# Patient Record
Sex: MALE | Race: WHITE | NOT HISPANIC OR LATINO | Employment: FULL TIME | URBAN - METROPOLITAN AREA
[De-identification: names, ages, dates, MRNs, and addresses within clinical notes are randomized per-mention and may not be internally consistent; named-entity substitution may affect disease eponyms.]

---

## 2022-07-15 PROCEDURE — 99283 EMERGENCY DEPT VISIT LOW MDM: CPT | Mod: EDC

## 2022-07-16 ENCOUNTER — HOSPITAL ENCOUNTER (EMERGENCY)
Facility: MEDICAL CENTER | Age: 37
End: 2022-07-16
Attending: STUDENT IN AN ORGANIZED HEALTH CARE EDUCATION/TRAINING PROGRAM
Payer: COMMERCIAL

## 2022-07-16 VITALS
SYSTOLIC BLOOD PRESSURE: 131 MMHG | DIASTOLIC BLOOD PRESSURE: 89 MMHG | BODY MASS INDEX: 29.77 KG/M2 | HEART RATE: 60 BPM | TEMPERATURE: 98 F | HEIGHT: 73 IN | WEIGHT: 224.65 LBS | OXYGEN SATURATION: 98 % | RESPIRATION RATE: 16 BRPM

## 2022-07-16 DIAGNOSIS — T26.92XA CHEMICAL INJURY OF LEFT EYE: ICD-10-CM

## 2022-07-16 PROCEDURE — 700101 HCHG RX REV CODE 250: Performed by: STUDENT IN AN ORGANIZED HEALTH CARE EDUCATION/TRAINING PROGRAM

## 2022-07-16 PROCEDURE — 700111 HCHG RX REV CODE 636 W/ 250 OVERRIDE (IP): Performed by: STUDENT IN AN ORGANIZED HEALTH CARE EDUCATION/TRAINING PROGRAM

## 2022-07-16 PROCEDURE — 90715 TDAP VACCINE 7 YRS/> IM: CPT | Performed by: STUDENT IN AN ORGANIZED HEALTH CARE EDUCATION/TRAINING PROGRAM

## 2022-07-16 PROCEDURE — 90471 IMMUNIZATION ADMIN: CPT | Mod: EDC

## 2022-07-16 RX ORDER — PROPARACAINE HYDROCHLORIDE 5 MG/ML
1 SOLUTION/ DROPS OPHTHALMIC ONCE
Status: COMPLETED | OUTPATIENT
Start: 2022-07-16 | End: 2022-07-16

## 2022-07-16 RX ORDER — ERYTHROMYCIN 5 MG/G
OINTMENT OPHTHALMIC ONCE
Status: COMPLETED | OUTPATIENT
Start: 2022-07-16 | End: 2022-07-16

## 2022-07-16 RX ADMIN — CLOSTRIDIUM TETANI TOXOID ANTIGEN (FORMALDEHYDE INACTIVATED), CORYNEBACTERIUM DIPHTHERIAE TOXOID ANTIGEN (FORMALDEHYDE INACTIVATED), BORDETELLA PERTUSSIS TOXOID ANTIGEN (GLUTARALDEHYDE INACTIVATED), BORDETELLA PERTUSSIS FILAMENTOUS HEMAGGLUTININ ANTIGEN (FORMALDEHYDE INACTIVATED), BORDETELLA PERTUSSIS PERTACTIN ANTIGEN, AND BORDETELLA PERTUSSIS FIMBRIAE 2/3 ANTIGEN 0.5 ML: 5; 2; 2.5; 5; 3; 5 INJECTION, SUSPENSION INTRAMUSCULAR at 01:40

## 2022-07-16 RX ADMIN — FLUORESCEIN SODIUM 1 MG: 1 STRIP OPHTHALMIC at 01:29

## 2022-07-16 RX ADMIN — PROPARACAINE HYDROCHLORIDE 1 DROP: 5 SOLUTION/ DROPS OPHTHALMIC at 01:29

## 2022-07-16 RX ADMIN — ERYTHROMYCIN: 5 OINTMENT OPHTHALMIC at 01:43

## 2022-07-16 NOTE — ED PROVIDER NOTES
ED Provider Note    CHIEF COMPLAINT  Chief Complaint   Patient presents with   • Eye Pain     Pt reports battery acid fell into left eye at approx 1000 today while working on a car. Irrigated x 10 minutes with well water, 5 minutes with sterile saline.  Pt c/o worsening pain and blurred vision.        HPI  Kobi Olvera is a 36 y.o. male who presents with left eye irritation and persistent slightly blurred vision.  Patient got some battery acid into his left eye at approximately 10 AM 7/15 while he was working on his relatives car.  He is in town visiting from the [place] area.  He irrigated it for 10 minutes with well water, and then called his eye doctor back in [place] and discussed management with them.  Per their recommendations he has been flushing it with sterile saline all day as well as some bicarbonate solution.  He states that he has no pain, but he was concerned because his eye was still red and it was slightly blurred compared to the other one.  He also reports a small amount of discharge from that eye.  He does not wear contacts.  He has a history of astigmatism and does have glasses.  He does not know when his last tetanus shot was.  Patient works as a .    REVIEW OF SYSTEMS  See HPI for further details. All other systems are negative.     PAST MEDICAL HISTORY   has a past medical history of Hypothyroid.    SOCIAL HISTORY  Social History     Tobacco Use   • Smoking status: Never Smoker   • Smokeless tobacco: Never Used   Vaping Use   • Vaping Use: Never used   Substance and Sexual Activity   • Alcohol use: Never   • Drug use: Never   • Sexual activity: Not on file       SURGICAL HISTORY  patient denies any surgical history    CURRENT MEDICATIONS  Home Medications    **Home medications have not yet been reviewed for this encounter**         ALLERGIES  Allergies   Allergen Reactions   • Amoxicillin      hives       PHYSICAL EXAM  VITAL SIGNS: BP (!) 145/87   Pulse 66   Temp 36.6 °C (97.9  "°F) (Temporal)   Resp 14   Ht 1.854 m (6' 1\")   Wt 102 kg (224 lb 10.4 oz)   SpO2 99%   BMI 29.64 kg/m²     Pulse ox interpretation: I interpret this pulse ox as normal.  Constitutional: Alert in no apparent distress.  HENT: No signs of trauma, Bilateral external ears normal, Nose normal.   Eyes: Pupils are 4 mm equal and reactive bilaterally, conjunctival injection of the left eye, right eye is normal.  Left eye: Fluorescein exam with no filling defects.  pH is 7.  Visual acuity in left eye is 20/20  Neck: Normal range of motion, No tenderness, Supple, No stridor.   Skin: Warm, Dry, No erythema, No rash.   Musculoskeletal: Good range of motion in all major joints. No major deformities noted.   Neurologic: Alert , Normal motor function, Normal speech, No focal deficits noted.   Psychiatric: Affect normal, Judgment normal, Mood normal.       COURSE & MEDICAL DECISION MAKING  Pertinent Labs & Imaging studies reviewed. (See chart for details)    36-year-old male presenting after chemical exposure to his eye earlier the prior day.  He has no corneal abrasion or ulcer on exam.  His vision is mildly impaired compared to the other side but not significant and is still in fact normal in the left eye.  His pH is normal, no further irrigation is indicated.  He does have some persistent irritation and conjunctival injection.  We will start him on antibiotics to prevent any infection.  He was also instructed on appropriate lubrication at home with ointment.  He has an eye doctor to follow-up with back in [place] where he lives and was advised to do this next week when he returns if his symptoms are persisting.  Tetanus was updated.  Discharged home with return precautions.    The patient will not drink alcohol nor drive with prescribed medications. The patient will return for worsening symptoms and is stable at the time of discharge. The patient verbalizes understanding and will comply.    FINAL IMPRESSION  1. Chemical " injury of left eye           Electronically signed by: Pamela Bowden M.D., 7/16/2022 1:26 AM

## 2022-07-16 NOTE — ED TRIAGE NOTES
Chief Complaint   Patient presents with   • Eye Pain     Pt reports battery acid fell into left eye at approx 1000 today while working on a car. Irrigated x 10 minutes with well water, 5 minutes with sterile saline.  Pt c/o worsening pain and blurred vision.      Pt ambulatory to triage with above complaint. Redness/ irritation noted to left eye. Pt rubbing eye frequently. Updated pt on triage process, encouraged to alert staff of any changes.

## 2022-07-16 NOTE — ED NOTES
Discharge instructions including the importance of hydration, the use of OTC medications, information on 1. Chemical injury of left eye   and the proper follow up recommendations have been provided. Verbalizes understanding.  Confirms all questions have been answered.  A copy of the discharge instructions have been provided.  A signed copy is in the chart.  All pertinent medications reviewed.  Patient out of department; pt in NAD, awake, alert, interactive and age appropriate   Site checked. NAD.

## 2022-07-16 NOTE — DISCHARGE INSTRUCTIONS
Use the erythromycin eye ointment in your eye twice daily for the next 5 days.  You can also get some over-the-counter eye lubrication ointment to use to help with symptoms.  If you are still having symptoms when you return home, please follow-up with your eye doctor.  Return to the emergency department if you develop severe abdominal pain, vision loss, or other concerns.